# Patient Record
Sex: FEMALE | Race: WHITE
[De-identification: names, ages, dates, MRNs, and addresses within clinical notes are randomized per-mention and may not be internally consistent; named-entity substitution may affect disease eponyms.]

---

## 2017-09-12 ENCOUNTER — HOSPITAL ENCOUNTER (OUTPATIENT)
Dept: HOSPITAL 62 - WI | Age: 47
End: 2017-09-12
Attending: NURSE PRACTITIONER
Payer: OTHER GOVERNMENT

## 2017-09-12 DIAGNOSIS — N64.4: Primary | ICD-10-CM

## 2017-09-12 PROCEDURE — 76642 ULTRASOUND BREAST LIMITED: CPT

## 2017-09-12 PROCEDURE — G0204 DX MAMMO INCL CAD BI: HCPCS

## 2017-09-12 PROCEDURE — 77066 DX MAMMO INCL CAD BI: CPT

## 2017-09-12 NOTE — WOMENS IMAGING REPORT
EXAM DESCRIPTION:  BILAT DIAGNOSTIC MAMMO W/CAD; U/S BREAST UNILAT LIMITED



COMPLETED DATE/TIME:  9/12/2017 11:14 am; 9/12/2017 12:52 pm



REASON FOR STUDY:  MASTODYNIA RT BREAST; PAIN RT BREAST; N64.4 N64.4  MASTODYNIA



COMPARISON:  None available



TECHNIQUE:  Standard craniocaudal and mediolateral oblique views of each breast recorded using digita
l acquisition.

Additional right breast 90 mediolateral view, right breast cone compression deep central breast in t
he CC and MLO orientations

Additional left breast 90 mediolateral view and cone compression upper outer quadrant.

Right breast ultrasound



LIMITATIONS:  None.



FINDINGS:  RIGHT BREAST

MASSES: No suspicious masses.

CALCIFICATIONS: No new or suspicious calcifications.

ARCHITECTURAL DISTORTION: None.

DEVELOPING DENSITY: None.

ASYMMETRY: None noted.

OTHER: No other significant findings.

LEFT BREAST

MASSES: No suspicious masses.

CALCIFICATIONS: No new or suspicious calcifications.

ARCHITECTURAL DISTORTION: None.

DEVELOPING DENSITY: None.

ASYMMETRY: None noted.

OTHER: No other significant finding.

Read with the assistance of CAD:

.[facility] - R2 Cenova Version 1.3

.[facility] - R2 Cenova Version 1.3

[facility] - R2 Cenova Version 2.4

.[facility] [place] R2 Cenova Version 2.4

.[facility] - R2  Version 9.2

Right breast ultrasound:

Patient presented today with right breast pain laterally.  Ultrasound of the lateral right breast dem
onstrates no focal findings.  No cystic or solid lesions.  No worrisome acoustic absorption.



IMPRESSION:  No mammographic or sonographic evidence for malignancy right breast.

No mammographic evidence for malignancy left breast.



BREAST DENSITY:  b. There are scattered areas of fibroglandular density.



BIRAD:  2 Benign findings.



RECOMMENDATION:  RECOMMENDED FOLLOW UP: Please continue yearly bilateral screening tomosynthesis in S
tember 2018

SPECIFIC INTERVENTION/IMAGING/CONSULTATION RECOMMENDED:No additional intervention/ imaging/consultati
on needed at this time.

COMMUNICATION:Patient notified by letter



COMMENT:  The patient has been notified of the results by letter per MQSA requirements. Additional no
tification policies are in place for contacting patient with suspicious or incomplete findings.

Quality ID #225: The American College of Radiology recommends an annual screening mammogram for women
 aged 40 years or over. This facility utilizes a reminder system to ensure that all patients receive 
reminder letters, and/or direct phone calls for appointments. This includes reminders for routine scr
eening mammograms, diagnostic mammograms, or other Breast Imaging Interventions when appropriate.  Th
is patient will be placed in the appropriate reminder system.

The American College of Radiology (ACR) has developed recommendations for screening MRI of the breast
s in certain patient populations, to be used in conjunction with mammography.  Breast MRI surveillanc
e may be appropriate for women with more than 20% lifetime risk of developing breast cancer  as deter
mined by genetic testing, significant family history of the disease, or history of mantle radiation f
or Hodgkins Disease.  ACR Practice Guidelines 2008.



TECHNICAL DOCUMENTATION:  FINDING NUMBER: (1)

ASSESSMENT: (1)

JOB ID:  [id number]

 2011 [employer]- All Rights Reserved

## 2017-09-12 NOTE — WOMENS IMAGING REPORT
EXAM DESCRIPTION:  BILAT DIAGNOSTIC MAMMO W/CAD; U/S BREAST UNILAT LIMITED



COMPLETED DATE/TIME:  9/12/2017 11:14 am; 9/12/2017 12:52 pm



REASON FOR STUDY:  MASTODYNIA RT BREAST; PAIN RT BREAST; N64.4 N64.4  MASTODYNIA



COMPARISON:  None available



TECHNIQUE:  Standard craniocaudal and mediolateral oblique views of each breast recorded using digita
l acquisition.

Additional right breast 90 mediolateral view, right breast cone compression deep central breast in t
he CC and MLO orientations

Additional left breast 90 mediolateral view and cone compression upper outer quadrant.

Right breast ultrasound



LIMITATIONS:  None.



FINDINGS:  RIGHT BREAST

MASSES: No suspicious masses.

CALCIFICATIONS: No new or suspicious calcifications.

ARCHITECTURAL DISTORTION: None.

DEVELOPING DENSITY: None.

ASYMMETRY: None noted.

OTHER: No other significant findings.

LEFT BREAST

MASSES: No suspicious masses.

CALCIFICATIONS: No new or suspicious calcifications.

ARCHITECTURAL DISTORTION: None.

DEVELOPING DENSITY: None.

ASYMMETRY: None noted.

OTHER: No other significant finding.

Read with the assistance of CAD:

.[facility] - R2 Cenova Version 1.3

.[facility] - R2 Cenova Version 1.3

.[facility] - R2 Cenova Version 2.4

.[facility] - R2 Cenova Version 2.4

.[facility] - R2  Version 9.2

Right breast ultrasound:

Patient presented today with right breast pain laterally.  Ultrasound of the lateral right breast dem
onstrates no focal findings.  No cystic or solid lesions.  No worrisome acoustic absorption.



IMPRESSION:  No mammographic or sonographic evidence for malignancy right breast.

No mammographic evidence for malignancy left breast.



BREAST DENSITY:  b. There are scattered areas of fibroglandular density.



BIRAD:  2 Benign findings.



RECOMMENDATION:  RECOMMENDED FOLLOW UP: Please continue yearly bilateral screening tomosynthesis in S
tember 2018

SPECIFIC INTERVENTION/IMAGING/CONSULTATION RECOMMENDED:No additional intervention/ imaging/consultati
on needed at this time.

COMMUNICATION:Patient notified by letter



COMMENT:  The patient has been notified of the results by letter per MQSA requirements. Additional no
tification policies are in place for contacting patient with suspicious or incomplete findings.

Quality ID #225: The American College of Radiology recommends an annual screening mammogram for women
 aged 40 years or over. This facility utilizes a reminder system to ensure that all patients receive 
reminder letters, and/or direct phone calls for appointments. This includes reminders for routine scr
eening mammograms, diagnostic mammograms, or other Breast Imaging Interventions when appropriate.  Th
is patient will be placed in the appropriate reminder system.

The American College of Radiology (ACR) has developed recommendations for screening MRI of the breast
s in certain patient populations, to be used in conjunction with mammography.  Breast MRI surveillanc
e may be appropriate for women with more than 20% lifetime risk of developing breast cancer  as deter
mined by genetic testing, significant family history of the disease, or history of mantle radiation f
or Hodgkins Disease.  ACR Practice Guidelines 2008.



TECHNICAL DOCUMENTATION:  FINDING NUMBER: (1)

ASSESSMENT: (1)

JOB ID:  [id number]

 2011 [employer]- All Rights Reserved

## 2018-10-15 ENCOUNTER — HOSPITAL ENCOUNTER (OUTPATIENT)
Dept: HOSPITAL 62 - WI | Age: 48
End: 2018-10-15
Attending: NURSE PRACTITIONER
Payer: COMMERCIAL

## 2018-10-15 DIAGNOSIS — Z12.31: Primary | ICD-10-CM

## 2018-10-15 PROCEDURE — 77067 SCR MAMMO BI INCL CAD: CPT

## 2018-10-16 NOTE — WOMENS IMAGING REPORT
EXAM DESCRIPTION:  BILAT SCREENING MAMMO W/CAD



COMPLETED DATE/TIME:  10/15/2018 9:05 am



REASON FOR STUDY:  SCREENING MAMMO Z12.31  ENCNTR SCREEN MAMMOGRAM FOR MALIGNANT NEOPLASM OF NORMA



COMPARISON:  2017



TECHNIQUE:  Standard craniocaudal and mediolateral oblique views of each breast recorded using digita
l acquisition.



LIMITATIONS:  None.



FINDINGS:  No masses, calcifications or architectural distortion. No areas of suspicion.

Read with the assistance of CAD.

.[facility] - R2 Cenova Version 1.3

.[facility] - R2 Cenova Version 1.3

.[facility] - R2 Cenova Version 2.4

.[facility] - R2 Cenova Version 2.4

.[facility] - R2  Version 9.2



IMPRESSION:  NORMAL MAMMOGRAM.  BIRADS 1.



BREAST DENSITY:  b. There are scattered areas of fibroglandular density.



BIRAD:  1 NEGATIVE



RECOMMENDATION:  ROUTINE SCREENING

Please continue yearly bilateral screening mammography/tomosynthesis in October 2019



COMMENT:  The patient has been notified of the results by letter per SA requirements. Additional no
tification policies are in place for contacting patient with suspicious or incomplete findings.

Quality ID #225: The American College of Radiology recommends an annual screening mammogram for women
 aged 40 years or over. This facility utilizes a reminder system to ensure that all patients receive 
reminder letters, and/or direct phone calls for appointments. This includes reminders for routine scr
eening mammograms, diagnostic mammograms, or other Breast Imaging Interventions when appropriate.  Th
is patient will be placed in the appropriate reminder system.

The American College of Radiology (ACR) has developed recommendations for screening MRI of the breast
s in certain patient populations, to be used in conjunction with mammography.  Breast MRI surveillanc
e may be appropriate for women with more than 20% lifetime risk of developing breast cancer  as deter
mined by genetic testing, significant family history of the disease, or history of mantle radiation f
or Hodgkins Disease.  ACR Practice Guidelines 2008.



TECHNICAL DOCUMENTATION:  FINDING NUMBER: (1)

ASSESSMENT: (1)

JOB ID:  [id number]

 2011 [employer]- All Rights Reserved



Reading location - IP/workstation name: [facility]